# Patient Record
Sex: MALE | Race: BLACK OR AFRICAN AMERICAN
[De-identification: names, ages, dates, MRNs, and addresses within clinical notes are randomized per-mention and may not be internally consistent; named-entity substitution may affect disease eponyms.]

---

## 2019-04-16 ENCOUNTER — HOSPITAL ENCOUNTER (EMERGENCY)
Dept: HOSPITAL 8 - ED | Age: 49
Discharge: HOME | End: 2019-04-16
Payer: COMMERCIAL

## 2019-04-16 VITALS — DIASTOLIC BLOOD PRESSURE: 93 MMHG | SYSTOLIC BLOOD PRESSURE: 126 MMHG

## 2019-04-16 VITALS — HEIGHT: 78 IN | WEIGHT: 168.87 LBS | BODY MASS INDEX: 19.54 KG/M2

## 2019-04-16 DIAGNOSIS — S00.93XA: ICD-10-CM

## 2019-04-16 DIAGNOSIS — I10: ICD-10-CM

## 2019-04-16 DIAGNOSIS — S16.1XXA: Primary | ICD-10-CM

## 2019-04-16 DIAGNOSIS — J45.909: ICD-10-CM
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
## 2019-04-16 DIAGNOSIS — E11.9: ICD-10-CM
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
## 2019-04-16 PROCEDURE — 99284 EMERGENCY DEPT VISIT MOD MDM: CPT

## 2019-04-16 PROCEDURE — 70450 CT HEAD/BRAIN W/O DYE: CPT

## 2019-04-16 PROCEDURE — 72125 CT NECK SPINE W/O DYE: CPT

## 2019-04-16 NOTE — NUR
PT WC'D TO ROOM 38 W/ C/O MGLF TRIPPED AND NOW HAS NECK PAIN. STARTED 2 DAYS 
AGO. PT STATES HE HAD +LOC "FOR A MINUTE". PT ALSO C/O HA AT THIS TIME. PT 
RESTING ON GURNEY. NADN. NEURO INTACT. NECK BRACE IN PLACE. BEULAH ADORNO AT 
BEDSIDE.

## 2019-04-23 ENCOUNTER — HOSPITAL ENCOUNTER (EMERGENCY)
Dept: HOSPITAL 8 - ED | Age: 49
Discharge: HOME | End: 2019-04-23
Payer: COMMERCIAL

## 2019-04-23 VITALS — SYSTOLIC BLOOD PRESSURE: 113 MMHG | DIASTOLIC BLOOD PRESSURE: 77 MMHG

## 2019-04-23 VITALS — HEIGHT: 78 IN | WEIGHT: 185.19 LBS | BODY MASS INDEX: 21.43 KG/M2

## 2019-04-23 DIAGNOSIS — K02.9: ICD-10-CM
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
## 2019-04-23 DIAGNOSIS — I10: ICD-10-CM

## 2019-04-23 DIAGNOSIS — S00.83XA: ICD-10-CM

## 2019-04-23 DIAGNOSIS — Z88.5: ICD-10-CM

## 2019-04-23 DIAGNOSIS — G43.909: ICD-10-CM

## 2019-04-23 DIAGNOSIS — S63.522A: Primary | ICD-10-CM

## 2019-04-23 DIAGNOSIS — J45.909: ICD-10-CM

## 2019-04-23 DIAGNOSIS — E11.9: ICD-10-CM
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
## 2019-04-23 DIAGNOSIS — S63.521A: ICD-10-CM

## 2019-04-23 PROCEDURE — 99284 EMERGENCY DEPT VISIT MOD MDM: CPT

## 2019-04-23 PROCEDURE — 70486 CT MAXILLOFACIAL W/O DYE: CPT

## 2020-06-27 ENCOUNTER — HOSPITAL ENCOUNTER (EMERGENCY)
Dept: HOSPITAL 8 - ED | Age: 50
Discharge: HOME | End: 2020-06-27
Payer: MEDICARE

## 2020-06-27 VITALS — DIASTOLIC BLOOD PRESSURE: 89 MMHG | SYSTOLIC BLOOD PRESSURE: 142 MMHG

## 2020-06-27 VITALS — WEIGHT: 172.62 LBS | HEIGHT: 78 IN | BODY MASS INDEX: 19.97 KG/M2

## 2020-06-27 DIAGNOSIS — I10: ICD-10-CM

## 2020-06-27 DIAGNOSIS — K08.89: Primary | ICD-10-CM

## 2020-06-27 PROCEDURE — 99283 EMERGENCY DEPT VISIT LOW MDM: CPT

## 2020-06-27 NOTE — NUR
FIRST CONTACT WITH PT. PT STATES FACIAL SWELLING X2 DAYS, WORSE ON RIGHT. PT 
PROTECTING OWN AIRWAY WELL IN TRIAGE. PT STATED"I HAVE DENTAL PAIN AS WELL." PT 
DENIES ANY OTHER SX. PT'S AOX4. RESPS EVEN AND UNLABORED. BP/SPO2 MONITORS IN 
PLACE. CALL LIGHT WITHIN REACH. PA AT BEDSIDE TO EVALUATE AT THIS TIME.

## 2021-09-26 ENCOUNTER — HOSPITAL ENCOUNTER (EMERGENCY)
Dept: HOSPITAL 8 - ED | Age: 51
Discharge: LEFT BEFORE DISCHARGE | End: 2021-09-26
Payer: MEDICARE

## 2021-09-26 VITALS — WEIGHT: 158.51 LBS | HEIGHT: 78 IN | BODY MASS INDEX: 18.34 KG/M2

## 2021-09-26 VITALS — DIASTOLIC BLOOD PRESSURE: 75 MMHG | SYSTOLIC BLOOD PRESSURE: 109 MMHG

## 2021-09-26 DIAGNOSIS — I10: ICD-10-CM

## 2021-09-26 DIAGNOSIS — D72.828: ICD-10-CM

## 2021-09-26 DIAGNOSIS — L02.511: Primary | ICD-10-CM

## 2021-09-26 DIAGNOSIS — E11.9: ICD-10-CM

## 2021-09-26 LAB
<PLATELET ESTIMATE>: (no result)
<PLT MORPHOLOGY>: (no result)
ALBUMIN SERPL-MCNC: 3.2 G/DL (ref 3.4–5)
ALP SERPL-CCNC: 105 U/L (ref 45–117)
ALT SERPL-CCNC: 17 U/L (ref 12–78)
ANION GAP SERPL CALC-SCNC: 6 MMOL/L (ref 5–15)
BILIRUB DIRECT SERPL-MCNC: NORMAL MG/DL
BILIRUB SERPL-MCNC: 0.3 MG/DL (ref 0.2–1)
CALCIUM SERPL-MCNC: 8.5 MG/DL (ref 8.5–10.1)
CHLORIDE SERPL-SCNC: 103 MMOL/L (ref 98–107)
CREAT SERPL-MCNC: 0.78 MG/DL (ref 0.7–1.3)
EOS#(MANUAL): 2.21 X10^3/UL (ref 0–0.4)
EOS% (MANUAL): 10 % (ref 1–7)
ERYTHROCYTE [DISTWIDTH] IN BLOOD BY AUTOMATED COUNT: 14.5 % (ref 9.4–14.8)
LYMPH#(MANUAL): 3.09 X10^3/UL (ref 1–3.4)
LYMPHS% (MANUAL): 14 % (ref 22–44)
MCH RBC QN AUTO: 29.5 PG (ref 27.5–34.5)
MCHC RBC AUTO-ENTMCNC: 34.1 G/DL (ref 33.2–36.2)
MONOS#(MANUAL): 0.44 X10^3/UL (ref 0.3–2.7)
MONOS% (MANUAL): 2 % (ref 2–9)
MYELOCYTES# (MANUAL): 0.22 X10^3/UL (ref 0–0)
MYELOCYTES% (MANUAL): 1 % (ref 0–0)
PLATELET # BLD AUTO: 424 X10^3/UL (ref 130–400)
PMV BLD AUTO: 7.2 FL (ref 7.4–10.4)
PROT SERPL-MCNC: 7.9 G/DL (ref 6.4–8.2)
RBC # BLD AUTO: 3.76 X10^6/UL (ref 4.38–5.82)
SEG#(MANUAL): 16.13 X10^3/UL (ref 1.8–6.8)
SEGS% (MANUAL): 73 % (ref 42–75)

## 2021-09-26 PROCEDURE — 96365 THER/PROPH/DIAG IV INF INIT: CPT

## 2021-09-26 PROCEDURE — 83605 ASSAY OF LACTIC ACID: CPT

## 2021-09-26 PROCEDURE — 80053 COMPREHEN METABOLIC PANEL: CPT

## 2021-09-26 PROCEDURE — 85025 COMPLETE CBC W/AUTO DIFF WBC: CPT

## 2021-09-26 PROCEDURE — 36415 COLL VENOUS BLD VENIPUNCTURE: CPT

## 2021-09-26 PROCEDURE — 87040 BLOOD CULTURE FOR BACTERIA: CPT

## 2021-09-26 PROCEDURE — 26010 DRAINAGE OF FINGER ABSCESS: CPT
